# Patient Record
Sex: FEMALE | Race: WHITE
[De-identification: names, ages, dates, MRNs, and addresses within clinical notes are randomized per-mention and may not be internally consistent; named-entity substitution may affect disease eponyms.]

---

## 2021-11-21 ENCOUNTER — HOSPITAL ENCOUNTER (EMERGENCY)
Dept: HOSPITAL 47 - EC | Age: 31
Discharge: HOME | End: 2021-11-21
Payer: MEDICAID

## 2021-11-21 VITALS — TEMPERATURE: 97.1 F | RESPIRATION RATE: 18 BRPM

## 2021-11-21 VITALS — DIASTOLIC BLOOD PRESSURE: 72 MMHG | SYSTOLIC BLOOD PRESSURE: 114 MMHG | HEART RATE: 63 BPM

## 2021-11-21 DIAGNOSIS — G43.909: ICD-10-CM

## 2021-11-21 DIAGNOSIS — Z72.89: ICD-10-CM

## 2021-11-21 DIAGNOSIS — U07.1: Primary | ICD-10-CM

## 2021-11-21 DIAGNOSIS — F41.9: ICD-10-CM

## 2021-11-21 PROCEDURE — 96361 HYDRATE IV INFUSION ADD-ON: CPT

## 2021-11-21 PROCEDURE — 96365 THER/PROPH/DIAG IV INF INIT: CPT

## 2021-11-21 PROCEDURE — 99283 EMERGENCY DEPT VISIT LOW MDM: CPT

## 2021-11-21 NOTE — ED
URI HPI





- General


Chief Complaint: Upper Respiratory Infection


Stated Complaint: COVID+/Wants antibodies


Time Seen by Provider: 11/21/21 13:47


Source: patient, RN notes reviewed


Mode of arrival: ambulatory


Limitations: no limitations





- Related Data


                                Home Medications











 Medication  Instructions  Recorded  Confirmed


 


Albuterol Inhaler [Ventolin Hfa 2 puff INHALATION RT-Q4H PRN 11/21/21 11/21/21





Inhaler]   


 


Cetirizine HCl [Zyrtec] 10 mg PO HS 11/21/21 11/21/21


 


Sertraline [Zoloft] 50 mg PO HS 11/21/21 11/21/21


 


Topiramate [Topamax] 25 mg PO HS 11/21/21 11/21/21











                                    Allergies











Allergy/AdvReac Type Severity Reaction Status Date / Time


 


No Known Allergies Allergy   Verified 11/21/21 14:05














Review of Systems


ROS Statement: 


Those systems with pertinent positive or pertinent negative responses have been 

documented in the HPI.





ROS Other: All systems not noted in ROS Statement are negative.





Past Medical History


Past Medical History: No Reported History


Additional Past Medical History / Comment(s): migraines


History of Any Multi-Drug Resistant Organisms: None Reported


Past Surgical History: No Surgical Hx Reported


Past Psychological History: Anxiety


Smoking Status: Never smoker


Past Alcohol Use History: Occasional


Past Drug Use History: None Reported





General Exam


Limitations: no limitations


General appearance: alert, in no apparent distress


Head exam: Present: atraumatic, normocephalic, normal inspection


ENT exam: Present: normal exam, mucous membranes moist


Neck exam: Present: normal inspection.  Absent: tenderness, meningismus, 

lymphadenopathy


Respiratory exam: Present: normal lung sounds bilaterally.  Absent: respiratory 

distress, wheezes, rales, rhonchi, stridor


Cardiovascular Exam: Present: regular rate, normal rhythm, normal heart sounds. 

 Absent: systolic murmur, diastolic murmur, rubs, gallop, clicks





Course





                                   Vital Signs











  11/21/21 11/21/21





  11:58 14:24


 


Temperature 97.1 F L 


 


Pulse Rate 65 63


 


Respiratory 18 18





Rate  


 


Blood Pressure 109/80 114/72


 


O2 Sat by Pulse 100 100





Oximetry  














Medical Decision Making





- Medical Decision Making


31-year-old female presents emergency Department with chief complaint of COVID-

19.  Patient states symptoms started last couple days test positive today is 

here for monoclonal antibodies.  Patient denies any sniffing shortness of breath

 or chest pain.  Patient's had increasing fever Bodyaches.  chills body aches.








Disposition


Clinical Impression: 


 COVID-19





Disposition: HOME SELF-CARE


Condition: Stable


Instructions (If sedation given, give patient instructions):  Coronavirus 

Disease 2019 (COVID-19)


Additional Instructions: 


Please return to the Emergency Department if symptoms worsen or any other 

concerns.


Is patient prescribed a controlled substance at d/c from ED?: No


Referrals: 


Cynthia Alejandre MD [Primary Care Provider] - 1-2 days

## 2023-10-10 ENCOUNTER — HOSPITAL ENCOUNTER (OUTPATIENT)
Dept: HOSPITAL 47 - RADUSWWP | Age: 33
Discharge: HOME | End: 2023-10-10
Attending: FAMILY MEDICINE
Payer: COMMERCIAL

## 2023-10-10 DIAGNOSIS — N92.6: Primary | ICD-10-CM

## 2023-10-10 PROCEDURE — 76856 US EXAM PELVIC COMPLETE: CPT

## 2023-10-10 NOTE — US
EXAMINATION TYPE: US pelvic complete

 

DATE OF EXAM: 10/10/2023

 

COMPARISON: NONE

 

CLINICAL INDICATION: Female, 33 years old with history of N92.6 IRREGULAR MENSTRUATION, UNSPECIFIED; 
dub

 

TECHNIQUE:  Transabdominal (TA).  Transabdominal sonographic images of the pelvis were acquired.  

Date of LMP:  10/3/2023

 

EXAM MEASUREMENTS:

 

Uterus:  8.2 x 3.4  4.2 cm

Endometrial Stripe: 0.4 cm

Right Ovary:  2.9 x 2.0 x 2.9 cm

Left Ovary:  2.8 x 2.0 x 2.1 cm

 

 

 

1. Uterus:  Anteverted   wnl

2. Endometrium:  wnl

3. Right Ovary:  wnl

4. Left Ovary:  wnl

5. Bilateral Adnexa:  wnl

6. Posterior cul-de-sac:  no free fluid seen

 

 

 

IMPRESSION:

1.  Endometrium within normal limits for thickness.

2.  No evidence for acute process.